# Patient Record
Sex: MALE | Race: WHITE
[De-identification: names, ages, dates, MRNs, and addresses within clinical notes are randomized per-mention and may not be internally consistent; named-entity substitution may affect disease eponyms.]

---

## 2021-12-23 NOTE — EDM.PDOC
ED HPI GENERAL MEDICAL PROBLEM





- General


Stated Complaint: POSSIBLE BROKEN LEG


Time Seen by Provider: 12/23/21 12:40


Source of Information: Reports: Patient


History Limitations: Reports: No Limitations





- History of Present Illness


INITIAL COMMENTS - FREE TEXT/NARRATIVE: 





This 60 yo male patient reports to the ED with pain to his mid lateral left 

thigh. The patient reports he fell yesterday onto some of their home canned 

vegetables. Since the fall, the patient reports increased pain despite taking 

Tylenol. The patient reports he attempted to get into his primary care facility,

but there were no appointments available. 


Onset Date: 12/22/21


Duration: Constant


Location: Reports: Lower Extremity, Left


Quality: Reports: Ache


Severity: Moderate


Improves with: Reports: None


Worsens with: Reports: None


Context: Reports: Activity


Associated Symptoms: Reports: No Other Symptoms


Treatments PTA: Reports: Acetaminophen


  ** Left Upper Thigh


Pain Score (Numeric/FACES): 7





- Related Data


                                    Allergies











Allergy/AdvReac Type Severity Reaction Status Date / Time


 


allopurinol Allergy  Nausea Verified 12/23/21 12:39


 


codeine Allergy  Nausea Verified 12/23/21 12:39


 


erythromycin base Allergy  Nausea Verified 12/23/21 12:39











Home Meds: 


                                    Home Meds





Febuxostat 40 mg DAILY 12/23/21 [History]


Warfarin [Coumadin] 7.5 mg PO DAILY 12/23/21 [History]


Warfarin [Coumadin] 10 mg PO ASDIRECTED 12/23/21 [History]











Past Medical History


Cardiovascular History: Reports: Blood Clots/VTE/DVT


Other Cardiovascular History: 2009





- Infectious Disease History


Infectious Disease History: Reports: None





Social & Family History





- Tobacco Use


Tobacco Use Status *Q: Never Tobacco User


Second Hand Smoke Exposure: No





- Caffeine Use


Caffeine Use: Reports: Coffee





- Recreational Drug Use


Recreational Drug Use: No





Review of Systems





- Review of Systems


Review Of Systems: Comprehensive ROS is negative, except as noted in HPI.





ED EXAM, GENERAL





- Physical Exam


Exam: See Below


Exam Limited By: No Limitations


General Appearance: Alert, WD/WN, Mild Distress


Eye Exam: Bilateral Eye: EOMI, Normal Inspection, PERRL


Ears: Normal External Exam, Normal Canal, Hearing Grossly Normal, Normal TMs


Nose: Normal Inspection, Normal Mucosa, No Blood


Throat/Mouth: Normal Inspection, Normal Lips, Normal Teeth, Normal Gums, Normal 

Oropharynx, Normal Voice, No Airway Compromise


Head: Atraumatic, Normocephalic


Neck: Normal Inspection, Supple, Non-Tender, Full Range of Motion


Respiratory/Chest: No Respiratory Distress, Lungs Clear, Normal Breath Sounds, 

No Accessory Muscle Use, Chest Non-Tender


Cardiovascular: Normal Peripheral Pulses, Regular Rate, Rhythm, No Edema, No 

Gallop, No JVD, No Murmur, No Rub


 (Male) Exam: Deferred


Rectal (Males) Exam: Deferred


Back Exam: Normal Inspection, Full Range of Motion, NT


Extremities: Leg Pain (left lateral thigh)


Neurological: Alert, Oriented, CN II-XII Intact, Normal Cognition, Normal Gait, 

Normal Reflexes, No Motor/Sensory Deficits


Psychiatric: Normal Affect, Normal Mood


Skin Exam: Warm, Dry, Intact, Normal Color, No Rash


Lymphatic: No Adenopathy





Course





- Vital Signs


Last Recorded V/S: 


                                Last Vital Signs











Temp  96.8 F L  12/23/21 12:31


 


Pulse  66   12/23/21 12:31


 


Resp  16   12/23/21 12:31


 


BP  184/114 H  12/23/21 12:31


 


Pulse Ox  98   12/23/21 12:31














Departure





- Departure


Time of Disposition: 12:56


Disposition: Home, Self-Care 01


Condition: Fair


Clinical Impression: 


Contusion of left thigh


Qualifiers:


 Encounter type: initial encounter Qualified Code(s): S70.12XA - Contusion of 

left thigh, initial encounter








- Discharge Information


*PRESCRIPTION DRUG MONITORING PROGRAM REVIEWED*: Not Applicable


*COPY OF PRESCRIPTION DRUG MONITORING REPORT IN PATIENT [place]: Not Applicable


Instructions:  Contusion, Easy-to-Read


Forms:  ED Department Discharge


Care Plan Goals: 


The patient was advised of the examination and x-ray results during the visit. 

The patient was encouraged to continue to take Tylenol for temporary symptom 

relief. The patient should continue to rest and ice the area. If the patient has

 any additional symptoms or concerns, the patient should either return to the 

emergency department or visit his primary care facility. 





Sepsis Event Note (ED)





- Evaluation


Sepsis Screening Result: No Definite Risk





- Focused Exam


Vital Signs: 


                                   Vital Signs











  Temp Pulse Resp BP Pulse Ox


 


 12/23/21 12:31  96.8 F L  66  16  184/114 H  98

## 2021-12-23 NOTE — CR
PROCEDURE INFORMATION: 

Exam: XR Left Femur 

Exam date and time: 12/23/2021 12:34 PM 

Age: 61 years old 

Clinical indication: Other: Fall onto left thigh with increased pain 



TECHNIQUE: 

Imaging protocol: XR Left femur. 

Views: 2 views. 



COMPARISON: 

No relevant prior studies available. 



FINDINGS: 

Bones/joints: Unremarkable. No acute fracture. 

Soft tissues: Unremarkable. 



IMPRESSION: 

No acute findings.

## 2023-09-25 ENCOUNTER — HOSPITAL ENCOUNTER (OUTPATIENT)
Dept: HOSPITAL 43 - DL.ENDO | Age: 63
Discharge: HOME | End: 2023-09-25
Attending: INTERNAL MEDICINE
Payer: OTHER GOVERNMENT

## 2023-09-25 DIAGNOSIS — Z88.5: ICD-10-CM

## 2023-09-25 DIAGNOSIS — Z12.11: Primary | ICD-10-CM

## 2023-09-25 DIAGNOSIS — Z88.8: ICD-10-CM

## 2023-09-25 DIAGNOSIS — E78.5: ICD-10-CM

## 2023-09-25 DIAGNOSIS — K63.5: ICD-10-CM

## 2023-09-25 DIAGNOSIS — Z88.1: ICD-10-CM

## 2023-09-25 DIAGNOSIS — Z86.718: ICD-10-CM

## 2023-09-25 DIAGNOSIS — M10.9: ICD-10-CM
